# Patient Record
Sex: FEMALE
[De-identification: names, ages, dates, MRNs, and addresses within clinical notes are randomized per-mention and may not be internally consistent; named-entity substitution may affect disease eponyms.]

---

## 2017-12-13 ENCOUNTER — CHARTING TRANS (OUTPATIENT)
Dept: OTHER | Age: 52
End: 2017-12-13

## 2018-11-02 VITALS
TEMPERATURE: 98.9 F | OXYGEN SATURATION: 97 % | RESPIRATION RATE: 20 BRPM | HEIGHT: 63 IN | WEIGHT: 150.98 LBS | DIASTOLIC BLOOD PRESSURE: 70 MMHG | BODY MASS INDEX: 26.75 KG/M2 | SYSTOLIC BLOOD PRESSURE: 120 MMHG | HEART RATE: 83 BPM

## 2020-03-24 ENCOUNTER — HOSPITAL ENCOUNTER (EMERGENCY)
Facility: HOSPITAL | Age: 55
Discharge: HOME | End: 2020-03-24
Attending: EMERGENCY MEDICINE
Payer: COMMERCIAL

## 2020-03-24 ENCOUNTER — HOSPITAL ENCOUNTER (OUTPATIENT)
Age: 55
Discharge: HOME | End: 2020-03-24
Attending: EMERGENCY MEDICINE
Payer: COMMERCIAL

## 2020-03-24 ENCOUNTER — APPOINTMENT (OUTPATIENT)
Dept: CT IMAGING | Facility: HOSPITAL | Age: 55
End: 2020-03-24
Attending: NURSE PRACTITIONER
Payer: COMMERCIAL

## 2020-03-24 VITALS
HEART RATE: 109 BPM | TEMPERATURE: 98 F | RESPIRATION RATE: 20 BRPM | SYSTOLIC BLOOD PRESSURE: 144 MMHG | OXYGEN SATURATION: 96 % | BODY MASS INDEX: 25.69 KG/M2 | DIASTOLIC BLOOD PRESSURE: 104 MMHG | HEIGHT: 63 IN | WEIGHT: 145 LBS

## 2020-03-24 VITALS
WEIGHT: 143 LBS | DIASTOLIC BLOOD PRESSURE: 84 MMHG | HEART RATE: 100 BPM | HEIGHT: 63 IN | OXYGEN SATURATION: 97 % | RESPIRATION RATE: 16 BRPM | BODY MASS INDEX: 25.34 KG/M2 | SYSTOLIC BLOOD PRESSURE: 123 MMHG

## 2020-03-24 DIAGNOSIS — S01.01XA LACERATION OF SCALP WITHOUT FOREIGN BODY, INITIAL ENCOUNTER: ICD-10-CM

## 2020-03-24 DIAGNOSIS — S09.90XA INJURY OF HEAD, INITIAL ENCOUNTER: Primary | ICD-10-CM

## 2020-03-24 DIAGNOSIS — S01.81XA FOREHEAD LACERATION, INITIAL ENCOUNTER: Primary | ICD-10-CM

## 2020-03-24 PROCEDURE — 99284 EMERGENCY DEPT VISIT MOD MDM: CPT

## 2020-03-24 PROCEDURE — 12054 INTMD RPR FACE/MM 7.6-12.5CM: CPT

## 2020-03-24 PROCEDURE — 70450 CT HEAD/BRAIN W/O DYE: CPT | Performed by: NURSE PRACTITIONER

## 2020-03-24 PROCEDURE — 99202 OFFICE O/P NEW SF 15 MIN: CPT

## 2020-03-24 PROCEDURE — 99212 OFFICE O/P EST SF 10 MIN: CPT

## 2020-03-24 PROCEDURE — 90471 IMMUNIZATION ADMIN: CPT

## 2020-03-24 PROCEDURE — 72125 CT NECK SPINE W/O DYE: CPT | Performed by: NURSE PRACTITIONER

## 2020-03-24 RX ORDER — BUPIVACAINE HYDROCHLORIDE 2.5 MG/ML
INJECTION, SOLUTION EPIDURAL; INFILTRATION; INTRACAUDAL
Status: COMPLETED
Start: 2020-03-24 | End: 2020-03-24

## 2020-03-24 RX ORDER — BUPIVACAINE HYDROCHLORIDE 2.5 MG/ML
20 INJECTION, SOLUTION EPIDURAL; INFILTRATION; INTRACAUDAL ONCE
Status: COMPLETED | OUTPATIENT
Start: 2020-03-24 | End: 2020-03-24

## 2020-03-24 RX ORDER — HYDROCODONE BITARTRATE AND ACETAMINOPHEN 5; 325 MG/1; MG/1
2 TABLET ORAL ONCE
Status: COMPLETED | OUTPATIENT
Start: 2020-03-24 | End: 2020-03-24

## 2020-03-24 NOTE — ED INITIAL ASSESSMENT (HPI)
Pt states that she was trying to fix a crown in her mouth, walked to the kitchen and tripped over a cooler and fell into a cabinet hitting her head. Large 6 cm long laceration noted to forehead. Pt denies loss of consciousness.  C/o nausea, denies dizziness

## 2020-03-24 NOTE — ED PROVIDER NOTES
Patient Seen in: [facility] Immediate Care In [address] [place] [address]    History   Patient presents with:  Laceration Abrasion    Stated Complaint: Sutures    HPI    Patient here with complaint of head injury. Injury occurred about an hour ago.   Patient denies round and reactive to light and accommodation. EOMI.  ENT: Oropharynx clear and patent without malocclusion of the jaw or dental injury. Neuro/Psych: Mood and affect normal, A and O x 3. Strength 5/5 in all extremities. Reflexes 2+ in all extremitites.  Nor

## 2020-03-25 NOTE — ED INITIAL ASSESSMENT (HPI)
Home in her kitchen when she tripped and fell, hitting her head on cabinet. Patient states LOC for a few seconds, being dizzy now and nauseous. Denies blood thinners.

## 2020-03-25 NOTE — ED PROVIDER NOTES
Patient Se[facility] Emergency Department    History   Patient presents with:  Laceration Abrasion      HPI    Patient presents to the ED after tripping and falling in her kitchen and hitting her head on the hinge of a cabinet.   She states prevent infection transmission during my interaction with the patient were taken. The patient was already wearing a droplet mask on my arrival to the room.  Personal protective equipment including droplet mask and gloves were worn throughout the duration of Ct Spine Cervical (cpt=72125)    Result Date: 3/24/2020  CONCLUSION:    There is no traumatic fracture, subluxation or prevertebral soft tissue swelling. Moderate multilevel spondylosis as discussed above.    Dictated by (CST): Perlita Marr MD on 3/2 contamination. The wound was repaired with subcutaneously 5-0 Vicryl, cutaneously 6-0 Prolene. The wound repair was intermediate in complexity. The procedure was performed by myself.     Additional verbal instructions and return precautions were discusse

## 2020-03-25 NOTE — ED NOTES
Patient sent from immediate care. Patient received zofran ODT while at [address] [place] [address],[address].

## 2020-03-30 ENCOUNTER — OFFICE VISIT (OUTPATIENT)
Dept: FAMILY MEDICINE CLINIC | Facility: CLINIC | Age: 55
End: 2020-03-30
Payer: COMMERCIAL

## 2020-03-30 VITALS
DIASTOLIC BLOOD PRESSURE: 78 MMHG | WEIGHT: 145 LBS | HEART RATE: 74 BPM | RESPIRATION RATE: 15 BRPM | TEMPERATURE: 98 F | BODY MASS INDEX: 25.69 KG/M2 | OXYGEN SATURATION: 98 % | SYSTOLIC BLOOD PRESSURE: 124 MMHG | HEIGHT: 63 IN

## 2020-03-30 DIAGNOSIS — Z02.9 ENCOUNTERS FOR UNSPECIFIED ADMINISTRATIVE PURPOSE: Primary | ICD-10-CM

## 2020-03-30 NOTE — PROGRESS NOTES
Pt presents to [facility] for suture removal.  Had a large RT forehead laceration repaired in ED 6 days ago. Approx 24 sutures. Pt accidentally hit head on corner of kitchen cabinet while preparing pantry for [address] outbreak.   Site healing well, no fever/syste

## 2020-04-03 ENCOUNTER — OFFICE VISIT (OUTPATIENT)
Dept: FAMILY MEDICINE CLINIC | Facility: CLINIC | Age: 55
End: 2020-04-03
Payer: COMMERCIAL

## 2020-04-03 VITALS
OXYGEN SATURATION: 99 % | BODY MASS INDEX: 26 KG/M2 | WEIGHT: 145 LBS | RESPIRATION RATE: 16 BRPM | SYSTOLIC BLOOD PRESSURE: 100 MMHG | TEMPERATURE: 99 F | DIASTOLIC BLOOD PRESSURE: 80 MMHG | HEART RATE: 77 BPM

## 2020-04-03 DIAGNOSIS — Z48.02 ENCOUNTER FOR REMOVAL OF SUTURES: Primary | ICD-10-CM

## 2020-04-03 PROCEDURE — 99024 POSTOP FOLLOW-UP VISIT: CPT | Performed by: PHYSICIAN ASSISTANT

## 2020-04-03 NOTE — PROGRESS NOTES
Pt presents for suture removal; placed in [facility] on 3/24/2020, 10 days ago. Denies fever, chills, body aches, or headache. PROCEDURE: suture removal.   LOCATION: R forehead/eyebrow   WOUND EXAM: well healed.  No drainage, erythema, warmth,  or

## 2020-04-03 NOTE — PATIENT INSTRUCTIONS
Stitches or Staple Removal  You were seen today for removal of your stitches or staples. Your wound is healing as expected. The wound has healed well enough that the stitches or staples can be removed.  The wound will continue to heal for the next few mon © 9902-0143 [employer] [address] [facility], [facility] [place]. All rights reserved. This information is not intended as a substitute for professional medical care. Always follow your healthcare professional's instructions.

## 2020-04-20 ENCOUNTER — OFFICE VISIT (OUTPATIENT)
Dept: INTERNAL MEDICINE CLINIC | Facility: CLINIC | Age: 55
End: 2020-04-20
Payer: COMMERCIAL

## 2020-04-20 VITALS
DIASTOLIC BLOOD PRESSURE: 84 MMHG | HEART RATE: 80 BPM | HEIGHT: 63 IN | OXYGEN SATURATION: 98 % | BODY MASS INDEX: 25.48 KG/M2 | SYSTOLIC BLOOD PRESSURE: 128 MMHG | WEIGHT: 143.81 LBS

## 2020-04-20 DIAGNOSIS — M54.2 NECK PAIN: Primary | ICD-10-CM
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
## 2020-04-20 DIAGNOSIS — Z00.00 PHYSICAL EXAM: ICD-10-CM

## 2020-04-20 DIAGNOSIS — F41.9 ANXIETY: ICD-10-CM

## 2020-04-20 PROCEDURE — 99203 OFFICE O/P NEW LOW 30 MIN: CPT | Performed by: FAMILY MEDICINE

## 2020-04-20 RX ORDER — MELOXICAM 7.5 MG/1
7.5 TABLET ORAL DAILY
Qty: 30 TABLET | Refills: 2 | Status: SHIPPED | OUTPATIENT
Start: 2020-04-20 | End: 2020-08-03

## 2020-04-20 NOTE — PROGRESS NOTES
Tammy Gabriel is a 53year old female. CC:  Patient presents with:  Urgent Care F/u  Establish Care      HPI:    New pt  Past pcp Dr Catrina Cortez    F/u ER and urgent care for fall- hit head on counter.   Had 12 cm laceration on forehead on 3/24  Had erik History    Tobacco Use      Smoking status: Never Smoker      Smokeless tobacco: Never Used    Alcohol use: Yes      Alcohol/week: 0.0 standard drinks      Comment: Wine, occasionally.     Drug use: Not on file       ROS:  GENERAL:  No weight change, no fev TSH W REFLEX TO FREE T4; Future  - LIPID PANEL; Future    3. Anxiety  Discussed  Recommend walk, get outside, exercise and try to limit time on social media/ news. Do relaxing activities. Call if anxiety getting worse.    Declines therapy referral or medi

## 2020-04-23 ENCOUNTER — NURSE ONLY (OUTPATIENT)
Dept: INTERNAL MEDICINE CLINIC | Facility: CLINIC | Age: 55
End: 2020-04-23
Payer: COMMERCIAL

## 2020-04-23 DIAGNOSIS — Z00.00 PHYSICAL EXAM: Primary | ICD-10-CM

## 2020-04-23 PROCEDURE — 36415 COLL VENOUS BLD VENIPUNCTURE: CPT | Performed by: FAMILY MEDICINE

## 2020-04-23 NOTE — PROGRESS NOTES
Confirmed  & full name, Pt was drawn today for   CBC, CMP, TSH, LIPID.  [facility]. Tolerated blood draw well. PARTHA MA

## 2020-05-04 ENCOUNTER — TELEPHONE (OUTPATIENT)
Dept: INTERNAL MEDICINE CLINIC | Facility: CLINIC | Age: 55
End: 2020-05-04

## 2020-05-04 PROBLEM — E78.5 HYPERLIPIDEMIA: Status: ACTIVE | Noted: 2020-05-04

## 2020-05-04 RX ORDER — ATORVASTATIN CALCIUM 40 MG/1
40 TABLET, FILM COATED ORAL NIGHTLY
Qty: 30 TABLET | Refills: 1 | Status: SHIPPED | OUTPATIENT
Start: 2020-05-04 | End: 2020-06-29

## 2020-05-04 NOTE — TELEPHONE ENCOUNTER
Please resend patient current refill request to [facility] , [address] in [address] [facility] [address]

## 2020-06-18 ENCOUNTER — APPOINTMENT (OUTPATIENT)
Dept: LAB | Age: 55
End: 2020-06-18
Attending: FAMILY MEDICINE
Payer: COMMERCIAL

## 2020-06-18 PROCEDURE — 80061 LIPID PANEL: CPT | Performed by: FAMILY MEDICINE

## 2020-06-18 PROCEDURE — 80053 COMPREHEN METABOLIC PANEL: CPT | Performed by: FAMILY MEDICINE

## 2020-06-18 PROCEDURE — 36415 COLL VENOUS BLD VENIPUNCTURE: CPT | Performed by: FAMILY MEDICINE

## 2020-06-29 RX ORDER — ATORVASTATIN CALCIUM 40 MG/1
TABLET, FILM COATED ORAL
Qty: 30 TABLET | Refills: 1 | Status: SHIPPED | OUTPATIENT
Start: 2020-06-29 | End: 2020-07-07

## 2020-07-07 ENCOUNTER — TELEPHONE (OUTPATIENT)
Dept: INTERNAL MEDICINE CLINIC | Facility: CLINIC | Age: 55
End: 2020-07-07

## 2020-07-07 RX ORDER — ATORVASTATIN CALCIUM 40 MG/1
40 TABLET, FILM COATED ORAL NIGHTLY
Qty: 90 TABLET | Refills: 1 | Status: SHIPPED | OUTPATIENT
Start: 2020-07-07 | End: 2020-12-18

## 2020-07-07 NOTE — TELEPHONE ENCOUNTER
RX COMMENT: ATORVASTATIN    ALTERNATIVE REQUESTED: WOULD YOU PLEASE SEND A NEW RX FOR 90 DAY SUPPLY(MANDATED BY INSURANCE COMPANY)

## 2020-08-03 RX ORDER — MELOXICAM 7.5 MG/1
TABLET ORAL
Qty: 30 TABLET | Refills: 2 | Status: SHIPPED | OUTPATIENT
Start: 2020-08-03

## 2020-12-18 RX ORDER — ATORVASTATIN CALCIUM 40 MG/1
TABLET, FILM COATED ORAL
Qty: 90 TABLET | Refills: 1 | Status: SHIPPED | OUTPATIENT
Start: 2020-12-18

## 2021-12-27 ENCOUNTER — OFFICE VISIT (OUTPATIENT)
Dept: FAMILY MEDICINE CLINIC | Facility: CLINIC | Age: 56
End: 2021-12-27
Payer: COMMERCIAL

## 2021-12-27 DIAGNOSIS — Z02.9 ENCOUNTERS FOR ADMINISTRATIVE PURPOSE: Primary | ICD-10-CM

## 2021-12-27 NOTE — PROGRESS NOTES
Called patient regarding [facility] visit. Patient states she got a covid test somewhere else and test positive. States mainly sore throat throat currently. No SOB or CP. Explained to call PCP for follow up within the next couple days. To ED if any SOB or CP.  Tyler Pulliam

## 2022-01-10 ENCOUNTER — TELEMEDICINE (OUTPATIENT)
Dept: INTERNAL MEDICINE CLINIC | Facility: CLINIC | Age: 57
End: 2022-01-10
Payer: COMMERCIAL

## 2022-01-10 DIAGNOSIS — U07.1 COVID-19: Primary | ICD-10-CM

## 2022-01-10 PROCEDURE — 99214 OFFICE O/P EST MOD 30 MIN: CPT | Performed by: FAMILY MEDICINE

## 2022-01-10 RX ORDER — BENZONATATE 100 MG/1
100 CAPSULE ORAL 3 TIMES DAILY PRN
Qty: 30 CAPSULE | Refills: 0 | Status: SHIPPED | OUTPATIENT
Start: 2022-01-10

## 2022-01-10 NOTE — PROGRESS NOTES
Telehealth outside of [address] Verbal Consent   I conducted a telehealth visit with Ailyn Teran today, 01/10/22, which was completed using two-way, real-time interactive audio and video communication.  This has been done in good monse t 1/10/22  -works [place] -    -reports feeling better but having sinus congestion  -occasional cough  -no difficulty breathing  -used tylenol, aspirin, hot tea, lemon, gargling with salt water, cleaning nose with salt solution.        ROS  GENE Vaping Use: Never used    Substance and Sexual Activity      Alcohol use: Yes        Alcohol/week: 0.0 standard drinks        Comment: Wine, occasionally.       Drug use: Not on file      Sexual activity: Not on file    Other Topics      Concerns:        Mi 04/23/2020       Lab Results   Component Value Date     06/18/2020    K 4.0 06/18/2020     06/18/2020    CO2 32.0 06/18/2020    ANIONGAP 3 06/18/2020    BUN 7 06/18/2020    CREATSERUM 0.75 06/18/2020    BUNCREA 9.3 (L) 06/18/2020     (H) the patient on the day of the encounter: 33 minutes

## 2022-01-10 NOTE — ASSESSMENT & PLAN NOTE
Feeling better at this time. Still with lingering cough - explained this is often the last symptom that goes away. Advised discontinuing aspirin. Advised otc cough suppressants. Desires trial of tessalon perles.    Needs FMLA paperwork filled out - w

## (undated) NOTE — LETTER
Date: 1/10/2022    Patient Name: Katya Palmer          To Whom it may concern: This letter has been written at the patient's request. The above patient was seen for management of a medical condition.     This patient should be excused from atten